# Patient Record
Sex: FEMALE | Race: BLACK OR AFRICAN AMERICAN | NOT HISPANIC OR LATINO | Employment: FULL TIME | URBAN - METROPOLITAN AREA
[De-identification: names, ages, dates, MRNs, and addresses within clinical notes are randomized per-mention and may not be internally consistent; named-entity substitution may affect disease eponyms.]

---

## 2024-05-24 ENCOUNTER — APPOINTMENT (OUTPATIENT)
Dept: CT IMAGING | Facility: HOSPITAL | Age: 49
End: 2024-05-24
Payer: OTHER MISCELLANEOUS

## 2024-05-24 ENCOUNTER — HOSPITAL ENCOUNTER (EMERGENCY)
Facility: HOSPITAL | Age: 49
Discharge: HOME | End: 2024-05-24
Attending: EMERGENCY MEDICINE
Payer: OTHER MISCELLANEOUS

## 2024-05-24 VITALS
BODY MASS INDEX: 40.32 KG/M2 | OXYGEN SATURATION: 99 % | TEMPERATURE: 98.4 F | DIASTOLIC BLOOD PRESSURE: 106 MMHG | HEIGHT: 71 IN | HEART RATE: 56 BPM | SYSTOLIC BLOOD PRESSURE: 164 MMHG | WEIGHT: 288 LBS | RESPIRATION RATE: 18 BRPM
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
## 2024-05-24 DIAGNOSIS — K76.9 LIVER LESION: ICD-10-CM

## 2024-05-24 DIAGNOSIS — S39.012A LUMBAR STRAIN, INITIAL ENCOUNTER: ICD-10-CM

## 2024-05-24 DIAGNOSIS — S30.1XXA CONTUSION OF ABDOMINAL WALL, INITIAL ENCOUNTER: ICD-10-CM

## 2024-05-24 LAB
B-HCG UR QL: NEGATIVE
BACTERIA UR QL AUTO: ABNORMAL /HPF
BILIRUB UR QL STRIP: NEGATIVE
CLARITY UR: ABNORMAL
COLOR UR: YELLOW
CREAT BLDA-MCNC: 0.7 MG/DL (ref 0.6–1.3)
DEPRECATED RDW RBC AUTO: 48.6 FL (ref 37–54)
ERYTHROCYTE [DISTWIDTH] IN BLOOD BY AUTOMATED COUNT: 13.9 % (ref 12.3–15.4)
EXPIRATION DATE: NORMAL
GLUCOSE UR STRIP-MCNC: NEGATIVE MG/DL
HCT VFR BLD AUTO: 41 % (ref 34–46.6)
HGB BLD-MCNC: 13.3 G/DL (ref 12–15.9)
HGB UR QL STRIP.AUTO: ABNORMAL
HYALINE CASTS UR QL AUTO: ABNORMAL /LPF
INTERNAL NEGATIVE CONTROL: NORMAL
INTERNAL POSITIVE CONTROL: NORMAL
KETONES UR QL STRIP: ABNORMAL
LEUKOCYTE ESTERASE UR QL STRIP.AUTO: NEGATIVE
Lab: NORMAL
MCH RBC QN AUTO: 30.6 PG (ref 26.6–33)
MCHC RBC AUTO-ENTMCNC: 32.4 G/DL (ref 31.5–35.7)
MCV RBC AUTO: 94.3 FL (ref 79–97)
NITRITE UR QL STRIP: NEGATIVE
NRBC BLD AUTO-RTO: 0 /100 WBC (ref 0–0.2)
PH UR STRIP.AUTO: 5.5 [PH] (ref 5–8)
PLATELET # BLD AUTO: 237 10*3/MM3 (ref 140–450)
PMV BLD AUTO: 11.9 FL (ref 6–12)
PROT UR QL STRIP: ABNORMAL
RBC # BLD AUTO: 4.35 10*6/MM3 (ref 3.77–5.28)
RBC # UR STRIP: ABNORMAL /HPF
REF LAB TEST METHOD: ABNORMAL
SP GR UR STRIP: 1.02 (ref 1–1.03)
SQUAMOUS #/AREA URNS HPF: ABNORMAL /HPF
UROBILINOGEN UR QL STRIP: ABNORMAL
WBC # UR STRIP: ABNORMAL /HPF
WBC NRBC COR # BLD AUTO: 7.06 10*3/MM3 (ref 3.4–10.8)

## 2024-05-24 PROCEDURE — 85025 COMPLETE CBC W/AUTO DIFF WBC: CPT | Performed by: NURSE PRACTITIONER

## 2024-05-24 PROCEDURE — 96372 THER/PROPH/DIAG INJ SC/IM: CPT

## 2024-05-24 PROCEDURE — 25010000002 KETOROLAC TROMETHAMINE PER 15 MG: Performed by: NURSE PRACTITIONER

## 2024-05-24 PROCEDURE — 82565 ASSAY OF CREATININE: CPT

## 2024-05-24 PROCEDURE — 81001 URINALYSIS AUTO W/SCOPE: CPT | Performed by: NURSE PRACTITIONER

## 2024-05-24 PROCEDURE — 74176 CT ABD & PELVIS W/O CONTRAST: CPT

## 2024-05-24 PROCEDURE — 99284 EMERGENCY DEPT VISIT MOD MDM: CPT

## 2024-05-24 PROCEDURE — 81025 URINE PREGNANCY TEST: CPT | Performed by: NURSE PRACTITIONER

## 2024-05-24 RX ORDER — SODIUM CHLORIDE 0.9 % (FLUSH) 0.9 %
10 SYRINGE (ML) INJECTION AS NEEDED
Status: DISCONTINUED | OUTPATIENT
Start: 2024-05-24 | End: 2024-05-24 | Stop reason: HOSPADM

## 2024-05-24 RX ORDER — KETOROLAC TROMETHAMINE 30 MG/ML
30 INJECTION, SOLUTION INTRAMUSCULAR; INTRAVENOUS ONCE
Status: COMPLETED | OUTPATIENT
Start: 2024-05-24 | End: 2024-05-24

## 2024-05-24 RX ORDER — METHOCARBAMOL 750 MG/1
750 TABLET, FILM COATED ORAL 3 TIMES DAILY PRN
Qty: 30 TABLET | Refills: 0 | Status: SHIPPED | OUTPATIENT
Start: 2024-05-24 | End: 2024-06-03

## 2024-05-24 RX ORDER — NAPROXEN 500 MG/1
500 TABLET ORAL 2 TIMES DAILY PRN
Qty: 20 TABLET | Refills: 0 | Status: SHIPPED | OUTPATIENT
Start: 2024-05-24

## 2024-05-24 RX ADMIN — KETOROLAC TROMETHAMINE 30 MG: 30 INJECTION, SOLUTION INTRAMUSCULAR; INTRAVENOUS at 15:17

## 2024-05-24 NOTE — DISCHARGE INSTRUCTIONS
Follow-up with primary care physician regarding liver lesion.     Take anti-inflammatory muscle relaxant.
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
## 2024-09-16 ENCOUNTER — TRANSCRIBE ORDERS (OUTPATIENT)
Dept: NUTRITION | Facility: HOSPITAL | Age: 49
End: 2024-09-16
Payer: COMMERCIAL

## 2024-09-16 DIAGNOSIS — K86.81 EXOCRINE PANCREATIC INSUFFICIENCY: Primary | ICD-10-CM

## 2024-10-04 ENCOUNTER — HOSPITAL ENCOUNTER (OUTPATIENT)
Dept: NUTRITION | Facility: HOSPITAL | Age: 49
Setting detail: RECURRING SERIES
Discharge: HOME | End: 2024-10-04

## 2024-10-04 PROCEDURE — 97802 MEDICAL NUTRITION INDIV IN: CPT

## 2024-10-04 NOTE — CONSULTS
Addended by: David Carver on: 1/18/2021 01:55 PM     Modules accepted: Orders [facility] Nutrition Services          Initial 60 Minute Nutrition Visit    Date: 10/04/2024   Patient Name: Kyle Mendoza  : 1975   MRN: [id number]   Referring Provider: Franko Siegel, *    Reason for Visit: EPI  Visit Format: TEAMS    Nutrition Assessment     Never smoker  Rarely uses alcohol    Active Problem List: GERD, EPI     Current Medications:   Current Outpatient Medications:     naproxen (NAPROSYN) 500 MG tablet, Take 1 tablet by mouth 2 (Two) Times a Day As Needed for Mild Pain., Disp: 20 tablet, Rfl: 0  Bariatric daily MV  ADEK Vitamin    Labs: no recent labs available    Hunger Vital Sign Food Insecurity Assessment:  Within the past 12 months I/we worried whether our food would run out before I/we got money to buy more: No   Within the past 12 months the food I/we bought just didn't last and I/we didn't have money to get more: No   Use of food assistance programs (WIC, food stamps, food ro) No       Food & Nutrition Related History       Food Allergies: NKFA  Food Intolerances: gluten sensitivity  Food Behavior: none  Nutrition Impact Symptoms: bloating ; gas; diarrhea  Gastrointestinal conditions that impact intake or food choices: EPI, GERD  Who prepares most meals: self  Who does grocery shopping: self  How many meals are purchased from fast food/sit down restaurants per week: did not discuss at this appointment  Difficulty chewin - Normal  Difficulty swallowin - Normal  Diet requirement related to personal preference or cultural belief: none indicated  History of eating disorder/disordered eating habits: None  Language/communication details: English  Barriers to learning: No barriers identified at this time    24 Hour Recall:   Time Food/beverages consumed   Breakfast If feeling hungry:  Egg bites  Sausage  [place]  *Will not eat for the rest of the day if Crystal eats breakfast    Skips if not feeling hungry   Snack none   Lunch none   Snack none   Dinner If  "did not have breakfast:  Animal protein  with vegetable (broccoli, b. Sprouts, cauliflower, spinach and green beans are tolerated and preferred)   Snack    Beverage Mostly water  Maybe one protein shake     Additional comments: Crystal reports that she usually eats one meal/day (breakfast or dinner) with protein shake (Premier or Fairlife) and a small amount of fruit as a snack (berries, pineapple, bananas are all tolerated). She states that some fruits she has difficulty digesting but that it can be fine one day and challenging another.     Anthropometrics      Height:   Ht Readings from Last 1 Encounters:   05/24/24 180.3 cm (71\")     Weight:   Wt Readings from Last 3 Encounters:   05/24/24 131 kg (288 lb)   07/22/14 (!) 213 kg (470 lb)   04/24/14 (!) 204 kg (450 lb 0.1 oz)     BMI: 40.45  Weight Change: Crystal reports that she has lost over 100lbs after the revision but would still like to lose ~30-40lbs more     Physical Activity     Physical activity comments: Active in the past but, as Crystal is unable to meet sufficient calorie/protein intake, she wishes to focus on improving nutrition before starting back with consistent exercise.      Estimated Needs     Estimated Energy Needs: Crystal reports that her Bariatric team has her following a ~1200kcal/day diet    Estimated Protein Needs: Crystal reports 60-75g/day per Bariatric team; RD calculates 95-110g/day (1-1.2g/kg from adj body weigth of 95kg)     Estimated Fluid Needs: aim for 64oz water/daily     Discussion / Education      Kyle is a 48 year old female referred for EPI. She has a history of GERD (now well controlled), peptic ulcers, cholecystectomy, and gastric bypass in 2018 with revision in 2022. Her biggest challenge is feeling full very quickly and then being unable to meet daily protein/kcal requirements. Her motivation is to lose weight as well as improve upon EPI symptoms. Crystal is currently eating one main meal per day along with one protein shake and an " inconsistent snack. She states that she will either eat a breakfast or dinner meal, depending on her level of nausea in the morning. If she does not eat breakfast, she will have dinner. If she feels good in the morning, that will be her main meal of the day. Crystal reports that her Creon dose is adequately controlling her symptoms on most days. She has noticed that foods may impact her differently day to day, especially fruits. Crytsal struggles with fatigue as well as inability to lose weight due to insufficient caloric intake. RD explained that extreme restriction of calories will slow metabolism and lead to difficulty losing weight as well as feelings of lethargy.     RD emphasized importance of smaller, more frequent meals to help increase calorie and protein intake, spacing small meals every 2-3 hours. Recommended including protein with a protein shake as well as from whole foods in order to increase intake. RD strongly suggested having something small to eat (ex: two strawberries with 2 tbsp yogurt) when Crystal is not feeling nauseous in order to increase intake. Discussed importance of including unsaturated fats in her diet for fat soluble vitamin absorption and bone health while avoiding/limiting saturated fats that can worsen EPI symptoms. RD stressed importance of sipping water throughout the day, reducing intake 15-30 minutes before/after meals to help prevent feelings of fullness before and feelings of bloating/discomfort after. RD recommended sipping a protein shake throughout the day as well in order to increase calories and protein intake. Crystal will try this to see how it impacts EPI symptoms and will stop if negative results experienced.     Assessment of patient engagement: Engaged; asked great questions    Measurement of understanding: Patient verbalized understanding; teach back    Resources Provided: [place] EPI and Healthy Fats; [employer] Bone Health toolkit; Nutrition label;  High fiber snacks; Healthy Snacks; Macros    SAMM and Crystal worked to set several goals. RD provided contact info and encouraged patient to reach out with any additional questions or concerns following the appointment.    Goal (s)      Goal 1: Include unsaturated fats into daily diet, but spread out evenly throughout the day to help with tolerance. ~50g acceptable. Avoid/reduce saturated fat intake (<10-12g/day).      Goal 2: If no nausea present, try to eat small snacks throughout the day as well as try to sip on protein shake throughout day and see how tolerated.      Plan of Care     PES Statement:   Inadequate oral intake related to skipping meals and not eating balanced meals as evidenced by dietary recall and interview.     Follow Up Visit      Follow Up:   Tuesday, November 19th at 8:45am via TEAMS    Total of 45 minutes spent with patient on nutrition counseling. Education based on Academy of Nutrition and Dietetics guidelines. Patient was provided with RD's contact information. Thank you for this referral.

## 2024-11-19 ENCOUNTER — HOSPITAL ENCOUNTER (OUTPATIENT)
Dept: NUTRITION | Facility: HOSPITAL | Age: 49
Setting detail: RECURRING SERIES
Discharge: HOME | End: 2024-11-19

## 2024-11-19 NOTE — PROGRESS NOTES
"[facility]          Free 30 Minute Nutrition Follow Up     Date: 2024   Patient Name: Kyle Mendoza  : 1975   MRN: [id number]   Referring Provider: Franko Siegel, *    Reason for Visit: EPI  Visit Format: TEAMS    Nutrition Assessment       Social History:   Social History     Socioeconomic History    Marital status: Single     Active Problem List: There are no active problems to display for this patient.     Current Medications:   Current Outpatient Medications:     naproxen (NAPROSYN) 500 MG tablet, Take 1 tablet by mouth 2 (Two) Times a Day As Needed for Mild Pain., Disp: 20 tablet, Rfl: 0  Creon  Colestipol  Equate Womens 50+ MV  Calcium plus Vit D  ADEK  Famotidine    Anthropometrics      Height:   Ht Readings from Last 1 Encounters:   24 180.3 cm (71\")     Weight:   Wt Readings from Last 3 Encounters:   24 131 kg (288 lb)   14 (!) 213 kg (470 lb)   14 (!) 204 kg (450 lb 0.1 oz)     Weight Change: Kyle reports being around 281lbs today.      Estimated Needs     Estimated Energy Needs: Crystal reports that her Bariatric team has her following a ~1200kcal/day diet     Estimated Protein Needs: Crystal reports 60-75g/day per Bariatric team; RD calculates 95-110g/day (1-1.2g/kg from adj body weigth of 95kg)      Estimated Fluid Needs: aim for 64oz water/daily     Discussion / Education      Kyle presents today for follow-up nutrition counseling for EPI. She has a history of GERD (now well controlled), peptic ulcers, cholecystectomy, and gastric bypass in  with revision in . Since the initial session, Kyle reports that 'she remembers why she conditioned herself to only eat once daily' in that she began experiencing increased abdominal pain, bloating and frequent and notably malodorous gas (which is causing emotional distress and embarrassment in social settings) when she began to eat smaller, more frequent meals. She " states that it does not matter what she eats (protein, fat, carb), she experiencing the above mentioned GI symptoms. Kyle has tried using Simethicone 500mg, which helped somewhat but is not currently utilizing. She has been eating Ribbon Candy during times of nausea, which she tolerates. Kyle describes fecal incontinence post-meals as well, although that has improved since beginning colestipol before bed. She has also not been taking Creon supplement for one week (since starting colestipol) to monitor for any significant improvements/regression of symptoms and has only noticed an improvement with the urgency to use the bathroom. She does report that she is no longer experiencing fecal urgency post fluid intake after starting colestipol. Kyle expresses concern that she may be experiencing bile acid malabsorption as opposed to EPI and plans to contact GI provider to inquire about further testing/workup. Additionally, Kyle is frustrated with the lack of continued weight loss.     Based on above described concerns, RD recommended returning to low fat diet of no more than 25-30g total fat per day (keeping saturated fat below 10g/day) and keeping fiber intake to no more than 15g/day to see if this helps with gas, bloating and abdominal pain. Emphasized importance of trying to reach minimal 1200kcal/day intake to improve upon fatigue, brain fog and stalled weight loss as well as increase nutrient intake. Kyle requested a substitute for added sugar for cereal, water, or tea as white sugar, stevia and aspartame all cause facial flushing, feeling shaky and occasional hypoglycemia. These symptoms may be due to rapid rise in blood sugar followed by severe drop. RD recommended decrease/elimination of added sugar in relation to GI symptoms and weight loss and suggested flavoring water with fruit/cucumber slices, trying individual containers of Vanilla flavored almond milk for cereal (as Kyle reports well  tolerated) or trying a small amount of honey in cereal/tea to see if this triggers the same reaction. RD to further research symptoms in context of additional nutrition recommendations.     Goal (s)    Previous goals:     Goal 1: Include unsaturated fats into daily diet, but spread out evenly throughout the day to help with tolerance. ~50g acceptable. Avoid/reduce saturated fat intake (<10-12g/day).  Met: 100% (Kyle tried to incorporate more unsaturated fats into overall diet but found that she was experiencing increased gas, bloating and abdominal pain as well as fecal incontinence. Will decrease fat intake for now).      Goal 2: If no nausea present, try to eat small snacks throughout the day as well as try to sip on protein shake throughout day and see how tolerated.  Met: 100% (Eating very small snacks every 2-3 hours but found that symptoms of extreme gas, bloating and abdominal pain present. Decreasing fat intake to see if this helps with symptoms).     Current goals:     Goal 1: Reduce fat intake to 25-30g total with minimal saturated fat intake.      Goal 2: Reduce fiber intake to 10-15g/day instead of DRI of 25g.      Goal 3: Follow-up with RD after speaking with GI provider about testing for BAM, re-testing for EPI.     Plan of Care     PES Statement:   Inadequate oral intake related to skipping meals and not eating balanced meals as evidenced by dietary recall and interview.     Kyle attempted smaller, more frequent meals to increase calories/nutrient needs but experienced increased GI symptoms. RD has recommended returning to low fat and fiber diet at this time.     Follow Up Visit      Follow Up:   Thursday, January 16th at 9:30am, TEAMS    Total of 30 minutes spent with patient on nutrition counseling. Education based on Academy of Nutrition and Dietetics guidelines. Patient was provided with RD's contact information. Thank you for this referral.

## 2025-01-16 ENCOUNTER — HOSPITAL ENCOUNTER (OUTPATIENT)
Dept: NUTRITION | Facility: HOSPITAL | Age: 50
Setting detail: RECURRING SERIES
Discharge: HOME | End: 2025-01-16

## 2025-01-16 PROCEDURE — 97803 MED NUTRITION INDIV SUBSEQ: CPT

## 2025-01-16 NOTE — PROGRESS NOTES
"[facility]   Continued 30 Minute Nutrition Follow Up     Date: 2025   Patient Name: Kyle Mendoza  : 1975   MRN: [id number]   Referring Provider: Franko Siegel, *    Reason for Visit: EPI  Visit Format: Phone  Last Appointment Date:     Nutrition Assessment       Updated Labs: None  Food Insecurity: No change  Food Behavior: None  Nutrition Impact Symptoms: gas, bloating, diarrhea  Difficulty chewin - Normal  Difficulty swallowin - Normal    Anthropometrics      Height:   Ht Readings from Last 1 Encounters:   24 180.3 cm (71\")     Weight:   Wt Readings from Last 3 Encounters:   24 131 kg (288 lb)   14 (!) 213 kg (470 lb)   14 (!) 204 kg (450 lb 0.1 oz)     BMI: 40.66  Weight Change: Staying consistent at ~288-290lbs     Discussion / Education      Kyle presents today for continued follow-up nutrition counseling and has non-scale victories to report. Kyle is now taking her Colestipol correctly for EPI (was following dosage for lowering cholesterol) by taking at night on an empty stomach and reports that she is no longer experiencing the negative GI symptoms that she reported in November (fecal urgency, diarrhea, bloating, abdominal pain). She is able to tolerate 1-2 protein shakes (OWYN 32g chocolate) per day (one at breakfast and the second in the evening if she feels hungry after dinner) and two meals (lunch and dinner). Previously, she was experiencing increased episodes of GI symptoms and was only able to eat once daily. Kyle is mindful of eating more quality lean protein, fruit and vegetables and creates a balanced plate for meals. For times when she eats out, she will order 1-2 vegetable sides and share a small amount of meat with her significant other. While she has not experienced weight loss, she feels an improvement in energy and has begun using the strength circuit at the [facility] to increase " muscle mass and lose inches. 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 She is now avoiding citrus and apples to help alleviate symptoms and is awaiting orders for labwork to test for B12 deficiency. Kyle states that she was told by her provider to consume more white carbs (potatoes and rice specifically) to help with bile acid malabsorption concerns and she feels that this helps.     RD celebrated reduction in negative GI symptoms and ability to consume more daily calories in the form of lean proteins, fruits and vegetables. RD encouraged Kyle to continue with protein shake in the morning and consistent lunch and dinner meals, adding a high protein snack as tolerated. RD applauded her efforts at the [facility] and discussed the importance of increasing muscle mass to help with weight management and prevent lean muscle loss during times of illness. RD discussed food items high in B12 (burning mouth syndrome). RD feels that the reduction in fiber and fat intake may be beneficial for managing GI symptoms, Kyle will monitor. SAMM and Kyle discussed that inches lost, muscle gained, increased energy levels, better sleep, etc are measures of positive changes with diet and to focus on these rather than the numbers on the scale.     Assessment of patient engagement: Engaged    Measurement of understanding: Patient verbalized understanding, Patient able to demonstrate understanding with teach back     Resources Provided:  No further resources provided at this time.     Goal (s)    Previous goals:  Goal 1: Reduce fat intake to 25-30g total with minimal saturated fat intake.  Met: 100%     Goal 2: Reduce fiber intake to 10-15g/day instead of DRI of 25g.  Met: 100%     Goal 3: Follow-up with RD after speaking with GI provider about testing for BAM, re-testing for EPI. Met: 100%    Current goals:  Goal 1:  Three consistent meals per day with 1-2 protein snacks as tolerated.     Goal 2: Consistent completion of strength circuit at [facility] 2-3 times/week, as tolerated.     Plan of Care     PES Statement:   Inadequate oral intake related to skipping meals and not eating balanced meals as evidenced by dietary recall and interview.     Status: Ongoing; has greatly improved since last visit due to proper dosing and intake of medication    Follow Up Visit      Follow Up not scheduled at this time Kyle will contact RD for future appointment    Total of 30 minutes spent with patient on nutrition counseling. Education based on Academy of Nutrition and Dietetics guidelines. Patient was provided with RD's contact information. Thank you for this referral.

## 2025-05-18 ENCOUNTER — HOSPITAL ENCOUNTER (EMERGENCY)
Facility: HOSPITAL | Age: 50
Discharge: HOME | End: 2025-05-18
Attending: EMERGENCY MEDICINE | Admitting: EMERGENCY MEDICINE
Payer: COMMERCIAL

## 2025-05-18 ENCOUNTER — APPOINTMENT (OUTPATIENT)
Dept: GENERAL RADIOLOGY | Facility: HOSPITAL | Age: 50
End: 2025-05-18
Payer: COMMERCIAL

## 2025-05-18 VITALS
DIASTOLIC BLOOD PRESSURE: 89 MMHG | HEIGHT: 71 IN | RESPIRATION RATE: 14 BRPM | OXYGEN SATURATION: 97 % | HEART RATE: 50 BPM | BODY MASS INDEX: 41.02 KG/M2 | TEMPERATURE: 99.5 F | SYSTOLIC BLOOD PRESSURE: 135 MMHG | WEIGHT: 293 LBS

## 2025-05-18 DIAGNOSIS — M25.562 ACUTE PAIN OF LEFT KNEE: Primary | ICD-10-CM

## 2025-05-18 PROCEDURE — 96372 THER/PROPH/DIAG INJ SC/IM: CPT

## 2025-05-18 PROCEDURE — 73560 X-RAY EXAM OF KNEE 1 OR 2: CPT

## 2025-05-18 PROCEDURE — 25010000002 KETOROLAC TROMETHAMINE PER 15 MG: Performed by: PHYSICIAN ASSISTANT

## 2025-05-18 PROCEDURE — 99283 EMERGENCY DEPT VISIT LOW MDM: CPT

## 2025-05-18 RX ORDER — KETOROLAC TROMETHAMINE 30 MG/ML
30 INJECTION, SOLUTION INTRAMUSCULAR; INTRAVENOUS ONCE
Status: COMPLETED | OUTPATIENT
Start: 2025-05-18 | End: 2025-05-18

## 2025-05-18 RX ADMIN — KETOROLAC TROMETHAMINE 30 MG: 30 INJECTION, SOLUTION INTRAMUSCULAR; INTRAVENOUS at 19:08

## 2025-05-18 NOTE — ED PROVIDER NOTES
Subjective   History of Present Illness  41-year-old female presents emergency department today with left knee pain.  Patient reports she has pain only when she bears weight or fully extends the knee and the knee feels unstable.  She had a previous meniscus tear several years back and her daughter was able to not have surgery and rehab did not physical therapy.  She has had no new trauma.  Denies any swelling.  Past medical history significant for the previous as mentioned meniscus tear.    History provided by:  Patient   used: No    Knee Pain  Location:  Knee  Time since incident:  2 days  Injury: no    Knee location:  L knee  Pain details:     Quality:  Sharp and shooting    Radiates to:  Does not radiate    Severity:  Moderate    Onset quality:  Sudden    Timing:  Constant  Chronicity:  New  Dislocation: no    Foreign body present:  No foreign bodies  Tetanus status:  Up to date  Prior injury to area:  Yes  Relieved by:  Rest  Worsened by:  Bearing weight  Ineffective treatments:  None tried  Associated symptoms: stiffness    Associated symptoms: no back pain, no decreased ROM, no numbness and no swelling    Risk factors: no frequent fractures and no recent illness        Review of Systems   Respiratory:  Negative for chest tightness, shortness of breath and wheezing.    Cardiovascular:  Negative for chest pain and palpitations.   Musculoskeletal:  Positive for stiffness. Negative for back pain.       History reviewed. No pertinent past medical history.    Allergies   Allergen Reactions   • Codeine Other (See Comments)     Difficulty breathing   • Gabapentin (Once-Daily) GI Intolerance   • Percocet [Oxycodone-Acetaminophen] Itching   • Cephalosporins Rash   • Neosporin [Bacitracin-Polymyxin B] Rash       Past Surgical History:   Procedure Laterality Date   • US GUIDED FINE NEEDLE ASPIRATION  1/16/2020   • US GUIDED FINE NEEDLE ASPIRATION  9/18/2019       History reviewed. No pertinent family  history.    Social History     Socioeconomic History   • Marital status: Single           Objective   Physical Exam  Vitals and nursing note reviewed.   Constitutional:       General: She is not in acute distress.     Appearance: She is well-developed. She is not diaphoretic.   HENT:      Head: Normocephalic and atraumatic.      Nose: Nose normal.   Eyes:      General: No scleral icterus.     Conjunctiva/sclera: Conjunctivae normal.   Pulmonary:      Effort: Pulmonary effort is normal. No respiratory distress.   Musculoskeletal:         General: Normal range of motion.      Cervical back: Normal range of motion and neck supple.      Comments: Left knee has tender with weightbearing.  There is no point tenderness no gross effusion no redness.  Distal pulses are palpable skin is warm pink and dry.  Full range of motion no laxity with valgus or varus.   Skin:     General: Skin is warm and dry.   Neurological:      Mental Status: She is alert and oriented to person, place, and time.   Psychiatric:         Behavior: Behavior normal.       Procedures           ED Course  ED Course as of 05/18/25 1921   Sun May 18, 2025   1920 Differential diagnosis #1 sprain #2 meniscus tear #3 [RADHA]      ED Course User Index  [RADHA] Reyes Maya PA                                         .everything              Medical Decision Making  Problems Addressed:  Acute pain of left knee: complicated acute illness or injury    Amount and/or Complexity of Data Reviewed  Radiology: ordered.    Risk  Prescription drug management.        Final diagnoses:   Acute pain of left knee       ED Disposition  ED Disposition       ED Disposition   Discharge    Condition   Stable    Comment   --               Gil Ulrich MD  [address] [facility] [address] [phone]      Call for appointment         Medication List        New Prescriptions      diclofenac 50 MG EC tablet  Commonly known as: VOLTAREN  Take 1 tablet by  mouth 3 (Three) Times a Day.            Stop      naproxen 500 MG tablet  Commonly known as: NAPROSYN               Where to Get Your Medications        These medications were sent to [facility] - [address]  - [phone]  - [phone]   [address] [address]      Phone: [phone]   diclofenac 50 MG EC tablet            Reyes Maya PA  05/22/25 1038

## 2025-05-18 NOTE — Clinical Note
[facility] EMERGENCY DEPARTMENT  [address]  [facility] [address]  Phone: [phone]    Kyle Mendoza was seen and treated in our emergency department on 5/18/2025.  She may return to work on 05/21/2025.         Thank you for choosing [facility].    Reyes Maya PA